# Patient Record
Sex: FEMALE | Race: WHITE | NOT HISPANIC OR LATINO | Employment: FULL TIME | URBAN - METROPOLITAN AREA
[De-identification: names, ages, dates, MRNs, and addresses within clinical notes are randomized per-mention and may not be internally consistent; named-entity substitution may affect disease eponyms.]

---

## 2023-01-24 ENCOUNTER — HOSPITAL ENCOUNTER (INPATIENT)
Facility: CLINIC | Age: 28
LOS: 1 days | Discharge: HOME | End: 2023-01-25
Attending: FAMILY MEDICINE | Admitting: OBSTETRICS & GYNECOLOGY
Payer: COMMERCIAL

## 2023-01-24 ENCOUNTER — TRANSFERRED RECORDS (OUTPATIENT)
Dept: HEALTH INFORMATION MANAGEMENT | Facility: CLINIC | Age: 28
End: 2023-01-24

## 2023-01-24 DIAGNOSIS — N99.820 POSTOPERATIVE VAGINAL BLEEDING FOLLOWING GENITOURINARY PROCEDURE: ICD-10-CM

## 2023-01-24 DIAGNOSIS — Z30.013 ENCOUNTER FOR INITIAL PRESCRIPTION OF INJECTABLE CONTRACEPTIVE: Primary | ICD-10-CM

## 2023-01-24 DIAGNOSIS — I95.81 HYPOTENSION AFTER PROCEDURE: ICD-10-CM

## 2023-01-24 LAB
ABO/RH(D): ABNORMAL
ALBUMIN SERPL-MCNC: 3.2 G/DL (ref 3.4–5)
ALBUMIN UR-MCNC: 10 MG/DL
ALP SERPL-CCNC: 64 U/L (ref 40–150)
ALT SERPL W P-5'-P-CCNC: 14 U/L (ref 0–50)
ANION GAP SERPL CALCULATED.3IONS-SCNC: 8 MMOL/L (ref 3–14)
ANTIBODY ID: NORMAL
ANTIBODY SCREEN: POSITIVE
APPEARANCE UR: CLEAR
APTT PPP: 23 SECONDS (ref 22–38)
AST SERPL W P-5'-P-CCNC: 10 U/L (ref 0–45)
B-HCG SERPL-ACNC: 104 IU/L (ref 0–5)
BASOPHILS # BLD AUTO: 0.1 10E3/UL (ref 0–0.2)
BASOPHILS NFR BLD AUTO: 1 %
BILIRUB SERPL-MCNC: 0.4 MG/DL (ref 0.2–1.3)
BILIRUB UR QL STRIP: NEGATIVE
BLD PROD TYP BPU: NORMAL
BLOOD COMPONENT TYPE: NORMAL
BUN SERPL-MCNC: 11 MG/DL (ref 7–30)
CA-I BLD-MCNC: 5 MG/DL (ref 4.4–5.2)
CALCIUM SERPL-MCNC: 8.3 MG/DL (ref 8.5–10.1)
CHLORIDE BLD-SCNC: 111 MMOL/L (ref 94–109)
CO2 SERPL-SCNC: 23 MMOL/L (ref 20–32)
CODING SYSTEM: NORMAL
COLOR UR AUTO: ABNORMAL
CPB POCT: NO
CREAT SERPL-MCNC: 0.89 MG/DL (ref 0.52–1.04)
EOSINOPHIL # BLD AUTO: 0.2 10E3/UL (ref 0–0.7)
EOSINOPHIL NFR BLD AUTO: 2 %
ERYTHROCYTE [DISTWIDTH] IN BLOOD BY AUTOMATED COUNT: 12 % (ref 10–15)
GFR SERPL CREATININE-BSD FRML MDRD: 90 ML/MIN/1.73M2
GLUCOSE BLD-MCNC: 185 MG/DL (ref 70–99)
GLUCOSE BLD-MCNC: 192 MG/DL (ref 70–99)
GLUCOSE UR STRIP-MCNC: NEGATIVE MG/DL
HCO3 BLDV-SCNC: 20 MMOL/L (ref 21–28)
HCT VFR BLD AUTO: 36.6 % (ref 35–47)
HCT VFR BLD CALC: 35 % (ref 35–47)
HGB BLD-MCNC: 11.9 G/DL (ref 11.7–15.7)
HGB BLD-MCNC: 12 G/DL (ref 11.7–15.7)
HGB UR QL STRIP: ABNORMAL
HOLD SPECIMEN: NORMAL
HYALINE CASTS: 4 /LPF
IMM GRANULOCYTES # BLD: 0.1 10E3/UL
IMM GRANULOCYTES NFR BLD: 1 %
INR BLD: 1.5 (ref 2–3)
INR PPP: 1.18 (ref 0.85–1.15)
ISSUE DATE AND TIME: NORMAL
KETONES UR STRIP-MCNC: NEGATIVE MG/DL
LACTATE SERPL-SCNC: 1.6 MMOL/L (ref 0.7–2)
LEUKOCYTE ESTERASE UR QL STRIP: NEGATIVE
LYMPHOCYTES # BLD AUTO: 6.4 10E3/UL (ref 0.8–5.3)
LYMPHOCYTES NFR BLD AUTO: 49 %
M AG RBC QL: NEGATIVE
MCH RBC QN AUTO: 31.1 PG (ref 26.5–33)
MCHC RBC AUTO-ENTMCNC: 32.8 G/DL (ref 31.5–36.5)
MCV RBC AUTO: 95 FL (ref 78–100)
MONOCYTES # BLD AUTO: 0.6 10E3/UL (ref 0–1.3)
MONOCYTES NFR BLD AUTO: 4 %
MUCOUS THREADS #/AREA URNS LPF: PRESENT /LPF
NEUTROPHILS # BLD AUTO: 5.6 10E3/UL (ref 1.6–8.3)
NEUTROPHILS NFR BLD AUTO: 43 %
NITRATE UR QL: NEGATIVE
NRBC # BLD AUTO: 0 10E3/UL
NRBC BLD AUTO-RTO: 0 /100
PCO2 BLDV: 41 MM HG (ref 40–50)
PH BLDV: 7.3 [PH] (ref 7.32–7.43)
PH UR STRIP: 6 [PH] (ref 5–7)
PLAT MORPH BLD: NORMAL
PLATELET # BLD AUTO: 258 10E3/UL (ref 150–450)
PO2 BLDV: 53 MM HG (ref 25–47)
POTASSIUM BLD-SCNC: 3.5 MMOL/L (ref 3.4–5.3)
POTASSIUM BLD-SCNC: 3.6 MMOL/L (ref 3.4–5.3)
PROT SERPL-MCNC: 5.7 G/DL (ref 6.8–8.8)
RBC # BLD AUTO: 3.86 10E6/UL (ref 3.8–5.2)
RBC MORPH BLD: NORMAL
RBC URINE: 26 /HPF
SAO2 % BLDV: 83 % (ref 94–100)
SODIUM BLD-SCNC: 140 MMOL/L (ref 133–144)
SODIUM SERPL-SCNC: 142 MMOL/L (ref 133–144)
SP GR UR STRIP: 1.02 (ref 1–1.03)
SPECIMEN EXPIRATION DATE: ABNORMAL
SPECIMEN EXPIRATION DATE: NORMAL
SPECIMEN EXPIRATION DATE: NORMAL
SQUAMOUS EPITHELIAL: 1 /HPF
UNIT ABO/RH: NORMAL
UNIT NUMBER: NORMAL
UNIT STATUS: NORMAL
UNIT TYPE ISBT: 9500
UROBILINOGEN UR STRIP-MCNC: NORMAL MG/DL
WBC # BLD AUTO: 12.8 10E3/UL (ref 4–11)
WBC URINE: 4 /HPF

## 2023-01-24 PROCEDURE — 250N000011 HC RX IP 250 OP 636

## 2023-01-24 PROCEDURE — 86905 BLOOD TYPING RBC ANTIGENS: CPT | Performed by: FAMILY MEDICINE

## 2023-01-24 PROCEDURE — 86901 BLOOD TYPING SEROLOGIC RH(D): CPT | Performed by: FAMILY MEDICINE

## 2023-01-24 PROCEDURE — 81001 URINALYSIS AUTO W/SCOPE: CPT

## 2023-01-24 PROCEDURE — 83605 ASSAY OF LACTIC ACID: CPT

## 2023-01-24 PROCEDURE — 99291 CRITICAL CARE FIRST HOUR: CPT | Mod: 25

## 2023-01-24 PROCEDURE — 84702 CHORIONIC GONADOTROPIN TEST: CPT | Performed by: FAMILY MEDICINE

## 2023-01-24 PROCEDURE — 86870 RBC ANTIBODY IDENTIFICATION: CPT | Performed by: FAMILY MEDICINE

## 2023-01-24 PROCEDURE — 86850 RBC ANTIBODY SCREEN: CPT | Performed by: FAMILY MEDICINE

## 2023-01-24 PROCEDURE — 99292 CRITICAL CARE ADDL 30 MIN: CPT | Mod: 25

## 2023-01-24 PROCEDURE — 36415 COLL VENOUS BLD VENIPUNCTURE: CPT | Performed by: FAMILY MEDICINE

## 2023-01-24 PROCEDURE — 85025 COMPLETE CBC W/AUTO DIFF WBC: CPT | Performed by: FAMILY MEDICINE

## 2023-01-24 PROCEDURE — 99291 CRITICAL CARE FIRST HOUR: CPT | Performed by: FAMILY MEDICINE

## 2023-01-24 PROCEDURE — 250N000011 HC RX IP 250 OP 636: Performed by: FAMILY MEDICINE

## 2023-01-24 PROCEDURE — 85730 THROMBOPLASTIN TIME PARTIAL: CPT | Performed by: FAMILY MEDICINE

## 2023-01-24 PROCEDURE — 85610 PROTHROMBIN TIME: CPT | Performed by: FAMILY MEDICINE

## 2023-01-24 PROCEDURE — 80053 COMPREHEN METABOLIC PANEL: CPT

## 2023-01-24 PROCEDURE — 36415 COLL VENOUS BLD VENIPUNCTURE: CPT

## 2023-01-24 PROCEDURE — 82947 ASSAY GLUCOSE BLOOD QUANT: CPT | Performed by: FAMILY MEDICINE

## 2023-01-24 PROCEDURE — 258N000003 HC RX IP 258 OP 636: Performed by: FAMILY MEDICINE

## 2023-01-24 PROCEDURE — 87040 BLOOD CULTURE FOR BACTERIA: CPT

## 2023-01-24 PROCEDURE — 120N000002 HC R&B MED SURG/OB UMMC

## 2023-01-24 PROCEDURE — 82947 ASSAY GLUCOSE BLOOD QUANT: CPT

## 2023-01-24 PROCEDURE — 85610 PROTHROMBIN TIME: CPT

## 2023-01-24 PROCEDURE — 99223 1ST HOSP IP/OBS HIGH 75: CPT | Mod: GC | Performed by: OBSTETRICS & GYNECOLOGY

## 2023-01-24 PROCEDURE — P9016 RBC LEUKOCYTES REDUCED: HCPCS

## 2023-01-24 RX ORDER — SODIUM CHLORIDE, SODIUM LACTATE, POTASSIUM CHLORIDE, CALCIUM CHLORIDE 600; 310; 30; 20 MG/100ML; MG/100ML; MG/100ML; MG/100ML
INJECTION, SOLUTION INTRAVENOUS CONTINUOUS
Status: DISCONTINUED | OUTPATIENT
Start: 2023-01-24 | End: 2023-01-24

## 2023-01-24 RX ORDER — CEFAZOLIN SODIUM 2 G/100ML
2 INJECTION, SOLUTION INTRAVENOUS EVERY 8 HOURS
Status: DISCONTINUED | OUTPATIENT
Start: 2023-01-24 | End: 2023-01-25

## 2023-01-24 RX ORDER — LIDOCAINE 40 MG/G
CREAM TOPICAL
Status: DISCONTINUED | OUTPATIENT
Start: 2023-01-24 | End: 2023-01-25 | Stop reason: HOSPADM

## 2023-01-24 RX ORDER — METHYLERGONOVINE MALEATE 0.2 MG/ML
200 INJECTION INTRAVENOUS ONCE
Status: COMPLETED | OUTPATIENT
Start: 2023-01-24 | End: 2023-01-24

## 2023-01-24 RX ORDER — PROCHLORPERAZINE MALEATE 5 MG
10 TABLET ORAL EVERY 6 HOURS PRN
Status: DISCONTINUED | OUTPATIENT
Start: 2023-01-24 | End: 2023-01-25 | Stop reason: HOSPADM

## 2023-01-24 RX ORDER — ONDANSETRON 2 MG/ML
4 INJECTION INTRAMUSCULAR; INTRAVENOUS EVERY 6 HOURS PRN
Status: DISCONTINUED | OUTPATIENT
Start: 2023-01-24 | End: 2023-01-25 | Stop reason: HOSPADM

## 2023-01-24 RX ORDER — HYDROXYZINE HYDROCHLORIDE 50 MG/1
50 TABLET, FILM COATED ORAL EVERY 6 HOURS PRN
Status: DISCONTINUED | OUTPATIENT
Start: 2023-01-24 | End: 2023-01-25 | Stop reason: HOSPADM

## 2023-01-24 RX ORDER — SODIUM CHLORIDE, SODIUM LACTATE, POTASSIUM CHLORIDE, CALCIUM CHLORIDE 600; 310; 30; 20 MG/100ML; MG/100ML; MG/100ML; MG/100ML
1000 INJECTION, SOLUTION INTRAVENOUS CONTINUOUS
Status: DISCONTINUED | OUTPATIENT
Start: 2023-01-24 | End: 2023-01-24

## 2023-01-24 RX ORDER — SODIUM CHLORIDE 9 MG/ML
INJECTION, SOLUTION INTRAVENOUS CONTINUOUS
Status: DISCONTINUED | OUTPATIENT
Start: 2023-01-24 | End: 2023-01-25

## 2023-01-24 RX ORDER — LANOLIN ALCOHOL/MO/W.PET/CERES
3 CREAM (GRAM) TOPICAL
Status: DISCONTINUED | OUTPATIENT
Start: 2023-01-24 | End: 2023-01-25 | Stop reason: HOSPADM

## 2023-01-24 RX ORDER — LEVONORGESTREL AND ETHINYL ESTRADIOL 0.15-0.03
1 KIT ORAL DAILY
Status: ON HOLD | COMMUNITY
End: 2023-01-25

## 2023-01-24 RX ORDER — SODIUM CHLORIDE, SODIUM LACTATE, POTASSIUM CHLORIDE, CALCIUM CHLORIDE 600; 310; 30; 20 MG/100ML; MG/100ML; MG/100ML; MG/100ML
1000 INJECTION, SOLUTION INTRAVENOUS CONTINUOUS
Status: DISCONTINUED | OUTPATIENT
Start: 2023-01-24 | End: 2023-01-25

## 2023-01-24 RX ORDER — PROCHLORPERAZINE 25 MG
25 SUPPOSITORY, RECTAL RECTAL EVERY 12 HOURS PRN
Status: DISCONTINUED | OUTPATIENT
Start: 2023-01-24 | End: 2023-01-25 | Stop reason: HOSPADM

## 2023-01-24 RX ORDER — ONDANSETRON 4 MG/1
4 TABLET, ORALLY DISINTEGRATING ORAL EVERY 6 HOURS PRN
Status: DISCONTINUED | OUTPATIENT
Start: 2023-01-24 | End: 2023-01-25 | Stop reason: HOSPADM

## 2023-01-24 RX ADMIN — CEFAZOLIN SODIUM 2 G: 2 INJECTION, SOLUTION INTRAVENOUS at 20:50

## 2023-01-24 RX ADMIN — SODIUM CHLORIDE, POTASSIUM CHLORIDE, SODIUM LACTATE AND CALCIUM CHLORIDE: 600; 310; 30; 20 INJECTION, SOLUTION INTRAVENOUS at 16:14

## 2023-01-24 RX ADMIN — METHYLERGONOVINE MALEATE 200 MCG: 0.2 INJECTION INTRAVENOUS at 16:18

## 2023-01-24 RX ADMIN — SODIUM CHLORIDE, POTASSIUM CHLORIDE, SODIUM LACTATE AND CALCIUM CHLORIDE 1000 ML: 600; 310; 30; 20 INJECTION, SOLUTION INTRAVENOUS at 16:59

## 2023-01-24 RX ADMIN — SODIUM CHLORIDE, POTASSIUM CHLORIDE, SODIUM LACTATE AND CALCIUM CHLORIDE 1000 ML: 600; 310; 30; 20 INJECTION, SOLUTION INTRAVENOUS at 17:57

## 2023-01-24 RX ADMIN — SODIUM CHLORIDE 1000 ML: 9 INJECTION, SOLUTION INTRAVENOUS at 16:13

## 2023-01-24 ASSESSMENT — ENCOUNTER SYMPTOMS
NAUSEA: 0
SHORTNESS OF BREATH: 0
TROUBLE SWALLOWING: 0
COUGH: 0
FATIGUE: 1
FEVER: 0
BACK PAIN: 0
ABDOMINAL PAIN: 0
WEAKNESS: 1
VOICE CHANGE: 0
ACTIVITY CHANGE: 1
DECREASED CONCENTRATION: 1
HEADACHES: 0
VOMITING: 0
CONFUSION: 0
AGITATION: 0
BRUISES/BLEEDS EASILY: 0
BLOOD IN STOOL: 0
LIGHT-HEADEDNESS: 1

## 2023-01-24 ASSESSMENT — ACTIVITIES OF DAILY LIVING (ADL)
ADLS_ACUITY_SCORE: 18
ADLS_ACUITY_SCORE: 18
DRESSING/BATHING_DIFFICULTY: NO
ADLS_ACUITY_SCORE: 18
CHANGE_IN_FUNCTIONAL_STATUS_SINCE_ONSET_OF_CURRENT_ILLNESS/INJURY: NO
WALKING_OR_CLIMBING_STAIRS_DIFFICULTY: NO
TOILETING_ISSUES: NO
CONCENTRATING,_REMEMBERING_OR_MAKING_DECISIONS_DIFFICULTY: NO
DOING_ERRANDS_INDEPENDENTLY_DIFFICULTY: NO
ADLS_ACUITY_SCORE: 35
WEAR_GLASSES_OR_BLIND: NO
FALL_HISTORY_WITHIN_LAST_SIX_MONTHS: NO
DIFFICULTY_EATING/SWALLOWING: NO

## 2023-01-24 NOTE — ED TRIAGE NOTES
[facility] terminated nonviable 7 w 2 d pregnancy       Triage Assessment     Row Name 01/24/23 1925       Triage Assessment (Adult)    Airway WDL WDL       Respiratory WDL    Respiratory WDL WDL       Skin Circulation/Temperature WDL    Skin Circulation/Temperature WDL WDL       Cardiac WDL    Cardiac WDL WDL       Peripheral/Neurovascular WDL    Peripheral Neurovascular WDL WDL       Cognitive/Neuro/Behavioral WDL    Cognitive/Neuro/Behavioral WDL WDL

## 2023-01-24 NOTE — ED PROVIDER NOTES
ED Provider Note  [facility] [place]      History     Chief Complaint   Patient presents with     Vaginal Bleeding     HPI  Mayda Ferro is a 27 year old female who who presents by ambulance from [facility] with excessive vaginal bleeding post elective termination of pregnancy approximately 7 weeks.  History somewhat limited because acuity of the case.  Patient apparently later history noted that she a few weeks ago had taken either oral medications and vaginal medications as abortifacient but with follow-up was not successful therefore operative procedure done a suction curettage with fentanyl 100 mics along with 2 mg of Versed to be given.  Patient had approxifive and cc bleeding from a vaginal catheter had 1 IV placed IV fluids given blood pressures were in the 70s heart rate around 70-60 patient cool clammy although still responsive without shortness of breath chest pain no abdominal pain now transferred to the ER for evaluation.  Patient complains of generalized weakness without true syncope denies chest pain no history of bleeding disorders otherwise or no history of any coagulopathy.  No fevers chills reported.  No abdominal pain otherwise or neurological changes noted.    Past Medical and Surgical History, Medications, Allergies, and Social History were reviewed in the electronic medical record. Review with the patient was attempted but limited due to altered mental status.      Review of Systems   Unable to perform ROS: Acuity of condition   Constitutional: Positive for activity change and fatigue. Negative for fever.   HENT: Negative for trouble swallowing and voice change.    Respiratory: Negative for cough and shortness of breath.    Cardiovascular: Negative for chest pain and leg swelling.   Gastrointestinal: Negative for abdominal pain, blood in stool, nausea and vomiting.   Genitourinary: Positive for vaginal bleeding. Negative for menstrual problem and vaginal pain.  "  Musculoskeletal: Negative for back pain.   Skin: Positive for pallor.   Allergic/Immunologic: Negative for immunocompromised state.   Neurological: Positive for weakness and light-headedness. Negative for syncope and headaches.   Hematological: Does not bruise/bleed easily.   Psychiatric/Behavioral: Positive for decreased concentration. Negative for agitation and confusion.   All other systems reviewed and are negative.      Physical Exam   BP: 92/80  Pulse: 63  Temp: 97.1  F (36.2  C)  Resp: 27  Height: 165 cm (5' 4.96\")  Weight: 85.7 kg (189 lb)  SpO2: 99 %  Physical Exam  Vitals and nursing note reviewed.   Constitutional:       General: She is in acute distress.      Appearance: She is well-developed. She is ill-appearing and diaphoretic.      Comments: Patient upon arrival by ambulance is pale cool clammy mildly hypotensive but not tachycardic still speaking full sentences ox saturation stable breathing without respiratory difficulty is not agitated or combative denies abdominal pain   HENT:      Head: Normocephalic and atraumatic.      Nose: Nose normal.      Mouth/Throat:      Mouth: Mucous membranes are moist.      Pharynx: Oropharynx is clear.   Eyes:      General: No scleral icterus.     Extraocular Movements: Extraocular movements intact.      Conjunctiva/sclera: Conjunctivae normal.      Pupils: Pupils are equal, round, and reactive to light.   Cardiovascular:      Rate and Rhythm: Normal rate and regular rhythm.   Pulmonary:      Effort: No respiratory distress.      Breath sounds: No stridor.   Abdominal:      General: There is no distension.      Palpations: Abdomen is soft. There is no mass.      Tenderness: There is no abdominal tenderness. There is no guarding.      Comments: Abdomen soft nonrigid.  There is no tenderness this upon examination.   Genitourinary:     Comments: Patient with uterine catheter in approximate 900 cc of blood noted.  Apparently 500 cc [facility].  Musculoskeletal:      Cervical back: Normal range of motion and neck supple. No rigidity or tenderness.   Skin:     General: Skin is warm.      Capillary Refill: Capillary refill takes 2 to 3 seconds.      Coloration: Skin is pale. Skin is not jaundiced.      Findings: No erythema or rash.   Neurological:      General: No focal deficit present.      Mental Status: She is alert and oriented to person, place, and time. Mental status is at baseline.   Psychiatric:      Comments: Flattened affect here in the ER otherwise appropriate.           ED Course, Procedures, & Data         Patient valuate upon arrival ambulance here in the ER.  We have paged OB but then did talk to the did come down see the patient prior to this we establish 2 large-bore IVs IV fluid resuscitation with normal saline followed by lactated Ringer's we ordered a type and cross for 2 units and also ordered O- 2 units also that would be brought down here in the ER to have if needed.  Patient had initially i-STAT hemoglobin noted to be 11.9 prior to procedure was reported to be 14.    Patient seen by OB/GYN staff down here in the ER.  They did a bedside ultrasound at this point felt there was not a large amount of blood still of the uterus by ultrasound and there was no free fluid otherwise noted.  This point patient will be continually monitored following serial hemoglobins will wait to see what laboratory hemoglobin was.  Blood pressures have been ranging upper 80s to 90s heart rate approximately 60 patient clinically appears better here in the ER.  Upon admit to OB/GYN for ongoing monitoring of postprocedural bleeding.    Patient's boyfriend also present in the ER.    Patient initial bedside evaluation done by OB here in the ER noted above.    Patient with IV fluid resuscitation received total of 3 L initially saline and lactated Ringer's and continuous infusion after that.  Patient continually monitored blood pressures did improve from  80s systolic now 200 heart rate is maintained patient appears much improved as far as color etc. does not appear to be pallorous anymore is neurologically intact functioning interacting with family also was present here in the ER.    Discussed with OB updated them they did reevaluate the patient also prior to patient being admitted their unit.  Discussed with OB also they were to order some IV antibiotics to make sure no infection and they will do so regarding this also.    Patient did have 2 units of red cells typed and crossed we were able to take the O- back to the blood bank as did not require needing this.  Patient continued vaginal output seem to have diminished and is maintained approximately less than 1000 cc total.    Patient's white count 12.8 hemoglobin is 12 INR 1.5.  Bedside but lab 1.18.  hCG was 104.  Sodium 142 potassium 3.6 creatinine 0.89.  Calcium 8.3 glucose 185.  Liver function test normal limits.    As noted patient is otherwise markedly improving with fluid resuscitation also given Methergine 0.2 mg IM to control bleeding which is certainly helped.  At this point no sign of any ongoing hemorrhage or decompensation with vital signs patient's hypotension is markedly improved becoming more normotensive after fluid resuscitation is noted.  Patient to be admitted to OB service for ongoing management of postprocedural hemorrhage now improving.          Records reviewed from [facility] that were sent here also.  With patient's past history etc.    Procedures       ED Course Selections: Critical Care Addendum    My initial assessment, based on my review of prehospital provider report, review of nursing observations, review of vital signs, focused history, physical exam, review of cardiac rhythm monitor, discussion with OB and interpretation of labs and patient response to treatment , established that Mayda Ferro has severe hemorrhage, which requires immediate intervention, and therefore  she is critically ill.     After the initial assessment, the care team initiated multiple lab tests, initiated IV fluid administration, initiated medication therapy with IV fluids and methergine 0.2mg IM and consulted with OB to provide stabilization care. Due to the critical nature of this patient, I reassessed nursing observations, vital signs, physical exam, review of cardiac rhythm monitor, 12 lead ECG analysis, interpretation of labs, mental status, neurologic status and respiratory status multiple times prior to her disposition.     Time also spent performing documentation, reviewing test results, discussion with consultants and coordination of care.     Critical care time (excluding teaching time and procedures): 60 minutes.                Results for orders placed or performed during the hospital encounter of 01/24/23   INR     Status: Abnormal   Result Value Ref Range    INR 1.18 (H) 0.85 - 1.15   Partial thromboplastin time     Status: Normal   Result Value Ref Range    aPTT 23 22 - 38 Seconds   Comprehensive metabolic panel     Status: Abnormal   Result Value Ref Range    Sodium 142 133 - 144 mmol/L    Potassium 3.6 3.4 - 5.3 mmol/L    Chloride 111 (H) 94 - 109 mmol/L    Carbon Dioxide (CO2) 23 20 - 32 mmol/L    Anion Gap 8 3 - 14 mmol/L    Urea Nitrogen 11 7 - 30 mg/dL    Creatinine 0.89 0.52 - 1.04 mg/dL    Calcium 8.3 (L) 8.5 - 10.1 mg/dL    Glucose 185 (H) 70 - 99 mg/dL    Alkaline Phosphatase 64 40 - 150 U/L    AST 10 0 - 45 U/L    ALT 14 0 - 50 U/L    Protein Total 5.7 (L) 6.8 - 8.8 g/dL    Albumin 3.2 (L) 3.4 - 5.0 g/dL    Bilirubin Total 0.4 0.2 - 1.3 mg/dL    GFR Estimate 90 >60 mL/min/1.73m2   HCG quantitative pregnancy     Status: Abnormal   Result Value Ref Range    hCG Quantitative 104 (H) 0 - 5 IU/L   UA with Microscopic reflex to Culture     Status: Abnormal    Specimen: Urine, Midstream   Result Value Ref Range    Color Urine Light Yellow Colorless, Straw, Light Yellow, Yellow     Appearance Urine Clear Clear    Glucose Urine Negative Negative mg/dL    Bilirubin Urine Negative Negative    Ketones Urine Negative Negative mg/dL    Specific Gravity Urine 1.019 1.003 - 1.035    Blood Urine Moderate (A) Negative    pH Urine 6.0 5.0 - 7.0    Protein Albumin Urine 10 (A) Negative mg/dL    Urobilinogen Urine Normal Normal, 2.0 mg/dL    Nitrite Urine Negative Negative    Leukocyte Esterase Urine Negative Negative    Mucus Urine Present (A) None Seen /LPF    RBC Urine 26 (H) <=2 /HPF    WBC Urine 4 <=5 /HPF    Squamous Epithelials Urine 1 <=1 /HPF    Hyaline Casts Urine 4 (H) <=2 /LPF    Narrative    Urine Culture not indicated   [place] Draw     Status: None (In process)    Narrative    The following orders were created for panel order [place] Draw.  Procedure                               Abnormality         Status                     ---------                               -----------         ------                     Extra Blue Top Tube[[id number]]                              Final result               Extra Red Top Tube[[id number]]                               Final result               Extra Green Top (Lithium...[[id number]]                                                 Extra Purple Top Tube[[id number]]                                                       Extra Purple Top Tube[[id number]]                            Final result                 Please view results for these tests on the individual orders.   CBC with platelets and differential     Status: Abnormal   Result Value Ref Range    WBC Count 12.8 (H) 4.0 - 11.0 10e3/uL    RBC Count 3.86 3.80 - 5.20 10e6/uL    Hemoglobin 12.0 11.7 - 15.7 g/dL    Hematocrit 36.6 35.0 - 47.0 %    MCV 95 78 - 100 fL    MCH 31.1 26.5 - 33.0 pg    MCHC 32.8 31.5 - 36.5 g/dL    RDW 12.0 10.0 - 15.0 %    Platelet Count 258 150 - 450 10e3/uL    % Neutrophils 43 %    % Lymphocytes 49 %    % Monocytes 4 %    % Eosinophils 2 %    % Basophils 1 %    % Immature  Granulocytes 1 %    NRBCs per 100 WBC 0 <1 /100    Absolute Neutrophils 5.6 1.6 - 8.3 10e3/uL    Absolute Lymphocytes 6.4 (H) 0.8 - 5.3 10e3/uL    Absolute Monocytes 0.6 0.0 - 1.3 10e3/uL    Absolute Eosinophils 0.2 0.0 - 0.7 10e3/uL    Absolute Basophils 0.1 0.0 - 0.2 10e3/uL    Absolute Immature Granulocytes 0.1 <=0.4 10e3/uL    Absolute NRBCs 0.0 10e3/uL   Extra Blue Top Tube     Status: None   Result Value Ref Range    Hold Specimen JIC    Extra Red Top Tube     Status: None   Result Value Ref Range    Hold Specimen JIC    Extra Purple Top Tube     Status: None   Result Value Ref Range    Hold Specimen JIC    iStat INR, POCT     Status: Abnormal   Result Value Ref Range    INR POCT 1.5 (L) 2.0 - 3.0   iStat Gases Electrolytes ICA Glucose Venous, POCT     Status: Abnormal   Result Value Ref Range    CPB Applied No     Hematocrit POCT 35 35 - 47 %    Calcium, Ionized Whole Blood POCT 5.0 4.4 - 5.2 mg/dL    Glucose Whole Blood POCT 192 (H) 70 - 99 mg/dL    Bicarbonate Venous POCT 20 (L) 21 - 28 mmol/L    Hemoglobin POCT 11.9 11.7 - 15.7 g/dL    Potassium POCT 3.5 3.4 - 5.3 mmol/L    Sodium POCT 140 133 - 144 mmol/L    pCO2 Venous POCT 41 40 - 50 mm Hg    pO2 Venous POCT 53 (H) 25 - 47 mm Hg    pH Venous POCT 7.30 (L) 7.32 - 7.43    O2 Sat, Venous POCT 83 (L) 94 - 100 %   RBC and Platelet Morphology     Status: None   Result Value Ref Range    Platelet Assessment  Automated Count Confirmed. Platelet morphology is normal.     Automated Count Confirmed. Platelet morphology is normal.    RBC Morphology Confirmed RBC Indices    Lactic acid whole blood     Status: Normal   Result Value Ref Range    Lactic Acid 1.6 0.7 - 2.0 mmol/L   Extra Tube     Status: None    Narrative    The following orders were created for panel order Extra Tube.  Procedure                               Abnormality         Status                     ---------                               -----------         ------                     Extra Purple  Top Tube[[id number]]                            Final result                 Please view results for these tests on the individual orders.   Extra Purple Top Tube     Status: None   Result Value Ref Range    Hold Specimen [facility]    Basic metabolic panel     Status: Abnormal   Result Value Ref Range    Sodium 143 133 - 144 mmol/L    Potassium 4.0 3.4 - 5.3 mmol/L    Chloride 113 (H) 94 - 109 mmol/L    Carbon Dioxide (CO2) 24 20 - 32 mmol/L    Anion Gap 6 3 - 14 mmol/L    Urea Nitrogen 7 7 - 30 mg/dL    Creatinine 0.68 0.52 - 1.04 mg/dL    Calcium 8.0 (L) 8.5 - 10.1 mg/dL    Glucose 103 (H) 70 - 99 mg/dL    GFR Estimate >90 >60 mL/min/1.73m2   CBC with platelets     Status: Abnormal   Result Value Ref Range    WBC Count 7.7 4.0 - 11.0 10e3/uL    RBC Count 2.53 (L) 3.80 - 5.20 10e6/uL    Hemoglobin 7.9 (L) 11.7 - 15.7 g/dL    Hematocrit 23.9 (L) 35.0 - 47.0 %    MCV 95 78 - 100 fL    MCH 31.2 26.5 - 33.0 pg    MCHC 33.1 31.5 - 36.5 g/dL    RDW 12.3 10.0 - 15.0 %    Platelet Count 129 (L) 150 - 450 10e3/uL   Adult Type and Screen     Status: Abnormal   Result Value Ref Range    ABO/RH(D) A POS     Antibody Screen Positive (A) Negative    SPECIMEN EXPIRATION DATE 20230127235900    Prepare red blood cells (unit)     Status: None (Preliminary result)   Result Value Ref Range    ISSUE DATE AND TIME 20230124160300     Blood Component Type Red Blood Cells     Product Code P8603C44     Unit Status Issued     Unit Number [id number]     UNIT ABO/RH O-     CODING SYSTEM [id number]     UNIT TYPE ISBT 9500    Prepare red blood cells (unit)     Status: None (Preliminary result)   Result Value Ref Range    ISSUE DATE AND TIME 20230124160300     Blood Component Type Red Blood Cells     Product Code C8228B38     Unit Status Issued     Unit Number [id number]     UNIT ABO/RH O-     CODING SYSTEM [id number]     UNIT TYPE ISBT 9500    Prepare red blood cells (unit)     Status: None (Preliminary result)   Result Value Ref Range    ISSUE DATE  AND TIME 20230124160300     Blood Component Type Red Blood Cells     Product Code M3893M23     Unit Status Returned     Unit Number [id number]     UNIT ABO/RH O-     CODING SYSTEM [id number]     UNIT TYPE ISBT 9500    Prepare red blood cells (unit)     Status: None (Preliminary result)   Result Value Ref Range    ISSUE DATE AND TIME 20230124160300     Blood Component Type Red Blood Cells     Product Code A2148Q20     Unit Status Returned     Unit Number [id number]     UNIT ABO/RH O-     CODING SYSTEM [id number]     UNIT TYPE ISBT 9500    Red Cell Antigen Typing Non ABO:     Status: None   Result Value Ref Range    M Antigen Type Negative     SPECIMEN EXPIRATION DATE 20230127235900    Antibody identification     Status: None   Result Value Ref Range    Antibody Identification Anti-M     SPECIMEN EXPIRATION DATE 20230127235900    CBC with platelets differential     Status: Abnormal    Narrative    The following orders were created for panel order CBC with platelets differential.  Procedure                               Abnormality         Status                     ---------                               -----------         ------                     CBC with platelets and d...[[id number]]  Abnormal            Final result               RBC and Platelet Morphology[[id number]]                      Final result                 Please view results for these tests on the individual orders.   ABO/Rh type and screen     Status: Abnormal    Narrative    The following orders were created for panel order ABO/Rh type and screen.  Procedure                               Abnormality         Status                     ---------                               -----------         ------                     Adult Type and Screen[[id number]]        Abnormal            Final result                 Please view results for these tests on the individual orders.   ABO/Rh type and screen *Canceled*     Status: None ()    Narrative    The  following orders were created for panel order ABO/Rh type and screen.  Procedure                               Abnormality         Status                     ---------                               -----------         ------                       Please view results for these tests on the individual orders.   ABO/Rh type and screen *Canceled*     Status: None ()    Narrative    The following orders were created for panel order ABO/Rh type and screen.  Procedure                               Abnormality         Status                     ---------                               -----------         ------                     Adult Type and Screen[[id number]]                                                         Please view results for these tests on the individual orders.     Medications   lidocaine 1 % 0.1-1 mL (has no administration in time range)   lidocaine (LMX4) cream (has no administration in time range)   sodium chloride (PF) 0.9% PF flush 3 mL (3 mLs Intracatheter Given 1/25/23 0821)   sodium chloride (PF) 0.9% PF flush 3 mL (has no administration in time range)   lidocaine 1 % 0.1-1 mL (has no administration in time range)   lidocaine (LMX4) cream (has no administration in time range)   sodium chloride (PF) 0.9% PF flush 3 mL (0 mLs Intracatheter Hold 1/25/23 0700)   sodium chloride (PF) 0.9% PF flush 3 mL (has no administration in time range)   melatonin tablet 1 mg (has no administration in time range)   ondansetron (ZOFRAN ODT) ODT tab 4 mg (has no administration in time range)     Or   ondansetron (ZOFRAN) injection 4 mg (has no administration in time range)   prochlorperazine (COMPAZINE) injection 10 mg (has no administration in time range)     Or   prochlorperazine (COMPAZINE) tablet 10 mg (has no administration in time range)     Or   prochlorperazine (COMPAZINE) suppository 25 mg (has no administration in time range)   melatonin tablet 3 mg (has no administration in time range)   hydrOXYzine (ATARAX)  tablet 50 mg (has no administration in time range)   ibuprofen (ADVIL/MOTRIN) tablet 600 mg (has no administration in time range)   acetaminophen (TYLENOL) tablet 650 mg (has no administration in time range)   0.9% sodium chloride BOLUS (0 mLs Intravenous Stopped 1/24/23 1757)   methylergonovine (METHERGINE) injection 200 mcg (200 mcg Intramuscular Given 1/24/23 1618)   lactated ringers BOLUS 1,000 mL (0 mLs Intravenous Stopped 1/24/23 1700)     Labs Ordered and Resulted from Time of ED Arrival to Time of ED Departure   INR - Abnormal       Result Value    INR 1.18 (*)    COMPREHENSIVE METABOLIC PANEL - Abnormal    Sodium 142      Potassium 3.6      Chloride 111 (*)     Carbon Dioxide (CO2) 23      Anion Gap 8      Urea Nitrogen 11      Creatinine 0.89      Calcium 8.3 (*)     Glucose 185 (*)     Alkaline Phosphatase 64      AST 10      ALT 14      Protein Total 5.7 (*)     Albumin 3.2 (*)     Bilirubin Total 0.4      GFR Estimate 90     HCG QUANTITATIVE PREGNANCY - Abnormal    hCG Quantitative 104 (*)    CBC WITH PLATELETS AND DIFFERENTIAL - Abnormal    WBC Count 12.8 (*)     RBC Count 3.86      Hemoglobin 12.0      Hematocrit 36.6      MCV 95      MCH 31.1      MCHC 32.8      RDW 12.0      Platelet Count 258      % Neutrophils 43      % Lymphocytes 49      % Monocytes 4      % Eosinophils 2      % Basophils 1      % Immature Granulocytes 1      NRBCs per 100 WBC 0      Absolute Neutrophils 5.6      Absolute Lymphocytes 6.4 (*)     Absolute Monocytes 0.6      Absolute Eosinophils 0.2      Absolute Basophils 0.1      Absolute Immature Granulocytes 0.1      Absolute NRBCs 0.0     ISTAT INR POCT - Abnormal    INR POCT 1.5 (*)    ISTAT GASES ELECTROLYTES ICA GLUCOSE VENOUS POCT - Abnormal    CPB Applied No      Hematocrit POCT 35      Calcium, Ionized Whole Blood POCT 5.0      Glucose Whole Blood POCT 192 (*)     Bicarbonate Venous POCT 20 (*)     Hemoglobin POCT 11.9      Potassium POCT 3.5      Sodium POCT 140       pCO2 Venous POCT 41      pO2 Venous POCT 53 (*)     pH Venous POCT 7.30 (*)     O2 Sat, Venous POCT 83 (*)    TYPE AND SCREEN, ADULT - Abnormal    ABO/RH(D) A POS      Antibody Screen Positive (*)     SPECIMEN EXPIRATION DATE 20230127235900     PARTIAL THROMBOPLASTIN TIME - Normal    aPTT 23     RBC AND PLATELET MORPHOLOGY    Platelet Assessment        Value: Automated Count Confirmed. Platelet morphology is normal.    RBC Morphology Confirmed RBC Indices     PREPARE RED BLOOD CELLS (UNIT)    ISSUE DATE AND TIME 20230124160300      Blood Component Type Red Blood Cells      Product Code X6632F08      Unit Status Issued      Unit Number [id number]      UNIT ABO/RH O-      CODING SYSTEM [id number]      UNIT TYPE ISBT 9500     PREPARE RED BLOOD CELLS (UNIT)    ISSUE DATE AND TIME 20230124160300      Blood Component Type Red Blood Cells      Product Code C5764T39      Unit Status Issued      Unit Number [id number]      UNIT ABO/RH O-      CODING SYSTEM [id number]      UNIT TYPE ISBT 9500     PREPARE RED BLOOD CELLS (UNIT)    ISSUE DATE AND TIME 20230124160300      Blood Component Type Red Blood Cells      Product Code N3460L63      Unit Status Returned      Unit Number [id number]      UNIT ABO/RH O-      CODING SYSTEM [id number]      UNIT TYPE ISBT 9500     PREPARE RED BLOOD CELLS (UNIT)    ISSUE DATE AND TIME 20230124160300      Blood Component Type Red Blood Cells      Product Code A1438K73      Unit Status Returned      Unit Number [id number]      UNIT ABO/RH O-      CODING SYSTEM YTXA101      UNIT TYPE ISBT 9500     PREPARE RED BLOOD CELLS (UNIT)     No orders to display          Medical Decision Making  The patient's presentation is strongly suggestive of an acute illness with systemic symptoms and an acute health issue posing potential threat to life or bodily function.    The patient's evaluation involved:  review of external note(s) from 2 sources (see separate area of note for details)  ordering and/or review  of 3+ test(s) in this encounter (see separate area of note for details)  review of 2 test result(s) ordered prior to this encounter (see separate area of note for details)  discussion of management or test interpretation with another health professional (see separate area of note for details)    The patient's management involved drug therapy requiring intensive monitoring (see separate area of note for details), a decision regarding emergency major surgery and a decision regarding hospitalization.      Assessment & Plan   27-year-old female from [facility] after having marked amount of vaginal bleeding post termination of pregnancy procedure.  Patient presented to the ER hypotensive pale what appeared to be hemorrhagic shock and had approximate total of 900 cc of bloody output from uterine catheter.  Patient had 2 large-bore IVs established IV fluid resuscitation seen by OB also quick ultrasound showed no free fluid etc.  Patient's hemoglobin was stable at 12 bleeding was controlled with IM Methergine also and IV fluids.  Patient improved markedly with resuscitative efforts in the ER and is mild normotensive appropriate stable.  Patient to be admitted to OB unit for ongoing monitoring serial hemoglobins and evaluation of postprocedural hemorrhage.  Patient did not require blood products but we had O- ready initially aware to return this to the blood bank also.  Patient has blood products available if needed.  Further management by OB.       I have reviewed the nursing notes. I have reviewed the findings, diagnosis, plan and need for follow up with the patient.    Current Discharge Medication List          Final diagnoses:   Postoperative vaginal bleeding following genitourinary procedure   Hypotension after procedure       Darrel Jackson  [facility] EMERGENCY DEPARTMENT  1/24/2023    This note was created at least in part by the use of dragon voice dictation system. Inadvertent typographical  errors may still exist.  Darrel Jackson MD.    Patient evaluated in the emergency department during the COVID-19 pandemic period. Careful attention to patients safety was addressed throughout the evaluation. Evaluation and treatment management was initiated with disposition made efficiently and appropriate as possible to minimize any risk of potential exposure to patient during this evaluation.       Darrel Jackson MD  01/25/23 0922

## 2023-01-24 NOTE — H&P
[facility] History and Physical    Mayda Ferro MRN# [id number]   Age: 27 year old YOB: 1995     Date of Admission:  2023    Primary care provider: No primary care provider on file.    Patient Summary:  Mayda Ferro is a 27 year old woman who presents for vaginal bleeding [facility]. During the procedure there was concern for a false passage.  The procedure was able to be completed.  After the procedure it was noted larger amounts of bleeding than normal and a 35 ml gonzalez ballon was placed in the uterus. EMS was called and the patient was transported to [facility] ED. She received 100 units of fentanyl and 2g of versed prior to the procedure. POC confirmed after procedure.         HPI: In trauma bay, laying in bed. She states is not in pain but is very lightheaded and tired. She is also very anxious. Unable to get thorough history and physical due to ED staff needed to access patient.  Gonzalez bag to intrauterine gonzalez with 900cc of blood.    ROS: otherwise negative, see HPI    PMH: none  PSHx: three prior  sections    Medications:   Current Facility-Administered Medications   Medication    0.9% sodium chloride BOLUS    lactated ringers BOLUS 1,000 mL    lactated ringers infusion    lactated ringers infusion    lidocaine (LMX4) cream    lidocaine 1 % 0.1-1 mL    sodium chloride (PF) 0.9% PF flush 3 mL    sodium chloride (PF) 0.9% PF flush 3 mL     Current Outpatient Medications   Medication    levonorgestrel-ethinyl estradiol (CHATEAL) 0.15-30 MG-MCG tablet     No current facility-administered medications on file prior to encounter.  levonorgestrel-ethinyl estradiol (CHATEAL) 0.15-30 MG-MCG tablet, Take 1 tablet by mouth daily        Allergies:   No Known Allergies    Social History: no reported history of smoking or recreational drug use    Physical Exam:   Vitals:    23 1555 23 1611   BP: 92/80    Pulse: 63    SpO2: 99%    Weight:   "85.7 kg (189 lb)   Height:  1.65 m (5' 4.96\")      Gen: pale and diaphoretic  HEENT: Neck supple, no cervical lymphadenopathy; oral MMM  CV: appears pale, peripheral circulation adequate, hypotensive and normal HR  Pulm: equal chest rise and non-labored breathing  Abd: non-tender, non-distended, no peritoneal signs, no guarding   Pelvis: external exam negative for active bleeding, gonzalez coming out of vagina  Extremities: non-tender, no erythema;     BSUS: no free fluid in posterior cul-de-sac, gonzalez appropriately in endometrial cavity. No blood pooling in uterus or other pelvic areas.     Labs: CBC, BMP    A&P: Mayda Ferro is a 26 yo presenting via EMS to the ED for post operative complications and bleeding [facility].     #Postop Hemorrhage--s/p suction D&C 7wga (completed)  At this time the patient appears to be relatively stable. She is responsive to fluid resuscitation. There is no further bleeding noted on exam. We will admit and observe closely.   - total EBL is 1300. 900cc in the gonzalez bag currently, 400 immediately after the procedure.   - Hgb stable 11.8 down from 14, will continue to trend  - Leave gonzalez balloon in 24 hours  - Ancef 2g Q8h while balloon in  - consider IR for continued bleeding, patient aware and counseled on further steps needed if bleeding were to continue with surgical intervention and possible hysterectomy as last case scenario     #Hypotension  -patient is fluid responsive, likely hypovolemic hypotension, continue fluids      Romain Christiansen DO, MA   OBGYN-PGY1    Appreciate Dr. Christiansen's note above, patient also seen and examined by me along with the resident. I agree with the note above.   Nury Fletcher MD    "

## 2023-01-24 NOTE — ED NOTES
Bed: ED01  Expected date:   Expected time:   Means of arrival:   Comments:  Hold for [facility] [birth year][facility] for [facility]  post procedure hemmoraging

## 2023-01-25 LAB
ANION GAP SERPL CALCULATED.3IONS-SCNC: 6 MMOL/L (ref 3–14)
BUN SERPL-MCNC: 7 MG/DL (ref 7–30)
CALCIUM SERPL-MCNC: 8 MG/DL (ref 8.5–10.1)
CHLORIDE BLD-SCNC: 113 MMOL/L (ref 94–109)
CO2 SERPL-SCNC: 24 MMOL/L (ref 20–32)
CREAT SERPL-MCNC: 0.68 MG/DL (ref 0.52–1.04)
ERYTHROCYTE [DISTWIDTH] IN BLOOD BY AUTOMATED COUNT: 12.3 % (ref 10–15)
ERYTHROCYTE [DISTWIDTH] IN BLOOD BY AUTOMATED COUNT: 12.3 % (ref 10–15)
GFR SERPL CREATININE-BSD FRML MDRD: >90 ML/MIN/1.73M2
GLUCOSE BLD-MCNC: 103 MG/DL (ref 70–99)
HCT VFR BLD AUTO: 23.9 % (ref 35–47)
HCT VFR BLD AUTO: 24.8 % (ref 35–47)
HGB BLD-MCNC: 7.9 G/DL (ref 11.7–15.7)
HGB BLD-MCNC: 8 G/DL (ref 11.7–15.7)
MCH RBC QN AUTO: 31.2 PG (ref 26.5–33)
MCH RBC QN AUTO: 31.4 PG (ref 26.5–33)
MCHC RBC AUTO-ENTMCNC: 32.3 G/DL (ref 31.5–36.5)
MCHC RBC AUTO-ENTMCNC: 33.1 G/DL (ref 31.5–36.5)
MCV RBC AUTO: 95 FL (ref 78–100)
MCV RBC AUTO: 97 FL (ref 78–100)
PLATELET # BLD AUTO: 129 10E3/UL (ref 150–450)
PLATELET # BLD AUTO: 151 10E3/UL (ref 150–450)
POTASSIUM BLD-SCNC: 4 MMOL/L (ref 3.4–5.3)
RBC # BLD AUTO: 2.53 10E6/UL (ref 3.8–5.2)
RBC # BLD AUTO: 2.55 10E6/UL (ref 3.8–5.2)
SODIUM SERPL-SCNC: 143 MMOL/L (ref 133–144)
WBC # BLD AUTO: 6.9 10E3/UL (ref 4–11)
WBC # BLD AUTO: 7.7 10E3/UL (ref 4–11)

## 2023-01-25 PROCEDURE — 250N000011 HC RX IP 250 OP 636

## 2023-01-25 PROCEDURE — 85014 HEMATOCRIT: CPT | Performed by: STUDENT IN AN ORGANIZED HEALTH CARE EDUCATION/TRAINING PROGRAM

## 2023-01-25 PROCEDURE — 82310 ASSAY OF CALCIUM: CPT

## 2023-01-25 PROCEDURE — 36415 COLL VENOUS BLD VENIPUNCTURE: CPT | Performed by: STUDENT IN AN ORGANIZED HEALTH CARE EDUCATION/TRAINING PROGRAM

## 2023-01-25 PROCEDURE — 85027 COMPLETE CBC AUTOMATED: CPT

## 2023-01-25 PROCEDURE — 258N000003 HC RX IP 258 OP 636

## 2023-01-25 PROCEDURE — 36415 COLL VENOUS BLD VENIPUNCTURE: CPT

## 2023-01-25 RX ORDER — MEDROXYPROGESTERONE ACETATE 150 MG/ML
150 INJECTION, SUSPENSION INTRAMUSCULAR ONCE
Qty: 1 ML | Refills: 0 | OUTPATIENT
Start: 2023-01-25 | End: 2023-01-25

## 2023-01-25 RX ORDER — MEDROXYPROGESTERONE ACETATE 150 MG/ML
150 INJECTION, SUSPENSION INTRAMUSCULAR ONCE
Status: COMPLETED | OUTPATIENT
Start: 2023-01-25 | End: 2023-01-25

## 2023-01-25 RX ORDER — FERROUS SULFATE 325(65) MG
325 TABLET ORAL
Qty: 30 TABLET | Refills: 3 | Status: SHIPPED | OUTPATIENT
Start: 2023-01-25

## 2023-01-25 RX ORDER — IBUPROFEN 600 MG/1
600 TABLET, FILM COATED ORAL EVERY 6 HOURS PRN
Status: DISCONTINUED | OUTPATIENT
Start: 2023-01-25 | End: 2023-01-25 | Stop reason: HOSPADM

## 2023-01-25 RX ORDER — ACETAMINOPHEN 325 MG/1
650 TABLET ORAL EVERY 4 HOURS PRN
Status: DISCONTINUED | OUTPATIENT
Start: 2023-01-25 | End: 2023-01-25 | Stop reason: HOSPADM

## 2023-01-25 RX ADMIN — SODIUM CHLORIDE, POTASSIUM CHLORIDE, SODIUM LACTATE AND CALCIUM CHLORIDE 1000 ML: 600; 310; 30; 20 INJECTION, SOLUTION INTRAVENOUS at 04:48

## 2023-01-25 RX ADMIN — MEDROXYPROGESTERONE ACETATE 150 MG: 150 INJECTION, SUSPENSION INTRAMUSCULAR at 14:55

## 2023-01-25 RX ADMIN — CEFAZOLIN SODIUM 2 G: 2 INJECTION, SOLUTION INTRAVENOUS at 04:48

## 2023-01-25 ASSESSMENT — ACTIVITIES OF DAILY LIVING (ADL)
ADLS_ACUITY_SCORE: 18

## 2023-01-25 NOTE — DISCHARGE SUMMARY
[facility] Gynecology Discharge Summary    Patient: Mayda Ferro  MRN: [id number]  YOB: 1995    Date of admission: 2023  Date of discharge: 23  Admitting physician: Nury Fletcher MD  Discharging physician: Leora Neely MD  Discharging service: Gynecology     Admission diagnoses:  -  s/p suction D&cC for missed    - Post D&C hemorrhage  - Acute blood loss anemia secondary to hemorrhage related to procedural blood loss    Discharge diagnoses:  - Same as above   - Thrombocytopenia secondary to acute blood loss    Procedures: Intrauterine gonzalez balloon removal     Consults: None    Brief HPI:  Mayda Ferro is a 27 year old woman who presents for vaginal bleeding [facility]. During the procedure there was concern for a false passage.  The procedure was able to be completed.  After the procedure it was noted larger amounts of bleeding than normal and a 35 ml gonzalez ballon was placed in the uterus. EMS was called and the patient was transported to [facility] ED. She received 100 units of fentanyl and 2g of versed prior to the procedure. POC confirmed after procedure.      In trauma bay, laying in bed. She states is not in pain but is very lightheaded and tired. She is also very anxious. Unable to get thorough history and physical due to ED staff needed to access patient.  Gonzalez bag to intrauterine gonzalez with 900cc of blood.      Hospital course:  . Recovered as anticipated and experienced no post-operative complications. Prior to discharge the patient was voiding spontaneously, tolerating a regular diet, passing flatus, and had adequate pain control on an oral regimen. The patient was discharged on POD#2.       Discharge medications:     Review of your medicines      UNREVIEWED medicines. Ask your doctor about these medicines      Dose / Directions   Chateal 0.15-30 MG-MCG tablet  Generic drug: levonorgestrel-ethinyl estradiol      Dose: 1  tablet  Take 1 tablet by mouth daily  Refills: 0            Discharge disposition: Discharge to home     Discharge instructions and follow up:  Discharge diet: Regular   Discharge activity: Activity as tolerated   Discharge follow-up: Follow up with primary care provide AS NEEDED   Other instructions: None      Romain KRISHNAMURTHY PGY1    Staff MD Note    I evaluated the patient on the day of discharge.  We reviewed discharge instructions.  Patient stable for discharge.    Leora Neely MD

## 2023-01-25 NOTE — PLAN OF CARE
Discharged instruction given, all questions answered. Pt left the unit at 1430 via wheelchair and accompanied by her SO. She was stable.   Meseret Santo RN on 1/25/2023 at 3:58 PM

## 2023-01-25 NOTE — PROGRESS NOTES
"Gynecology Progress Note     S: Pt arrived to floor. Feeling better tonight as compared to earlier. Hungry and wants to eat. Denies dizziness but generally feeling weak. Mild uterine cramping, tolerable. No other pain.     O:  BP 99/58 (BP Location: Left arm, Patient Position: Semi-Gonzalez's, Cuff Size: Adult Regular)   Pulse 64   Temp 97.1  F (36.2  C) (Axillary)   Resp 21   Ht 1.65 m (5' 4.96\")   Wt 85.7 kg (189 lb)   SpO2 100%   BMI 31.49 kg/m      Gen: comfortable, no acute distress  CV: Regular rate  Lungs: Normal work of breathing  Abd: soft, non tender  : intrauterine gonzalez in place, bag not emptied from earlier in the day but minimal output since arrival  Ext: warm and well perfused    Labs:  Hemoglobin   Date Value Ref Range Status   01/24/2023 12.0 11.7 - 15.7 g/dL Final     Hemoglobin POCT   Date Value Ref Range Status   01/24/2023 11.9 11.7 - 15.7 g/dL Final       A/P: Mayda Ferro is a 27 year old female POD#0 s/p suction D&C for pregnancy termination, transferred from outpatient clinic for procedure complicated by post-op hemorrhage. Initially hypotensive but improved with fluid resuscitation. Stable at this time.    FEN: continue IVF until tolerating adequate PO intake, ok to advance to reg diet now that clinically stable  Pain: Doing well on PO meds  GI: Continue stool softeners and antiemetics  : intrauterine gonzalez in place, will keep until at least tomorrow morning. Continue ancef q8h for infection ppx while gonzalez in  Heme: EBL 1300 total. Hgb as above, repeat CBC ordered for tomorrow morning or sooner PRN. Discussed may need blood transfusion if Hgb <8 pending symptoms or <7 regardless    Dispo: Anticipate discharge pending stable bleeding, likely tomorrow vs POD#2    Nolvia Rainey MD  ObGyn, PGY-3  01/24/2023 8:50 PM      "

## 2023-01-25 NOTE — PROGRESS NOTES
Pt arrived from unit @ 1818 from ED. Report received from CALI Apple. VSS , afebrile upon arrival to the unit. LR running @ 125mL/hr. No current bleeding into gonzalez bag. UA collected. Report given to CALI Prabhakar.

## 2023-01-25 NOTE — PROGRESS NOTES
"[facility]  Gynecology Progress Note    S:  Feeling well overall.  Tolerating PO intake without nausea or emesis. Voiding spontaneously. Passing flatus.  Ambulating to bathroom without dizziness or difficulty. Denies pain currently. Denies fevers, chills, chest pain, shortness of breath, or other systemic complaints.    O:  Patient Vitals for the past 24 hrs:   BP Temp Temp src Pulse Resp SpO2 Height Weight   01/25/23 0642 -- -- -- -- -- 99 % -- --   01/25/23 0637 -- -- -- -- -- 99 % -- --   01/25/23 0517 -- -- -- -- -- 99 % -- --   01/25/23 0512 -- -- -- -- -- 99 % -- --   01/25/23 0507 -- -- -- -- -- 98 % -- --   01/25/23 0502 -- -- -- -- -- 98 % -- --   01/25/23 0327 -- -- -- -- -- 99 % -- --   01/25/23 0322 -- -- -- -- -- 100 % -- --   01/25/23 0317 -- -- -- -- -- 100 % -- --   01/25/23 0313 119/59 98.6  F (37  C) Oral -- 20 -- -- --   01/25/23 0312 -- -- -- -- -- 100 % -- --   01/25/23 0111 -- -- -- -- -- 100 % -- --   01/24/23 1900 -- -- -- -- -- 100 % -- --   01/24/23 1818 99/58 97.1  F (36.2  C) Axillary -- -- 100 % -- --   01/24/23 1745 -- -- -- 64 21 100 % -- --   01/24/23 1730 101/65 -- -- 64 15 100 % -- --   01/24/23 1715 96/49 -- -- 66 (!) 31 100 % -- --   01/24/23 1700 90/59 -- -- 60 14 97 % -- --   01/24/23 1645 (!) 89/52 -- -- 56 15 100 % -- --   01/24/23 1630 (!) 85/57 -- -- 71 20 100 % -- --   01/24/23 1615 -- -- -- 75 18 99 % -- --   01/24/23 1611 (!) 89/46 -- -- 63 19 100 % 1.65 m (5' 4.96\") 85.7 kg (189 lb)   01/24/23 1600 (!) 89/51 -- -- 60 27 100 % -- --   01/24/23 1555 92/80 -- -- 63 -- 99 % -- --     Gen: NAD  CV: well perfused, RR  Resp: Non-labored breathing on room air  Abd: Soft, non-distended, minimally tender.   : Uterine gonzalez balloon removed at bedside chaperoned by RN. No bleeding noted following removal.   Ext: Trace edema    Date 01/25/23 0700 - 01/26/23 0659   Shift 5694-0363 0492-2196 3407-2239 24 Hour Total   INTAKE   Shift Total(mL/kg)     "   OUTPUT   Urine 600   600   Drains 400   400   Shift Total(mL/kg) 1000(11.66)   1000(11.66)   Weight (kg) 85.73 85.73 85.73 85.73     Recent Results (from the past 24 hour(s))   INR    Collection Time: 01/24/23  4:00 PM   Result Value Ref Range    INR 1.18 (H) 0.85 - 1.15   Partial thromboplastin time    Collection Time: 01/24/23  4:00 PM   Result Value Ref Range    aPTT 23 22 - 38 Seconds   Comprehensive metabolic panel    Collection Time: 01/24/23  4:00 PM   Result Value Ref Range    Sodium 142 133 - 144 mmol/L    Potassium 3.6 3.4 - 5.3 mmol/L    Chloride 111 (H) 94 - 109 mmol/L    Carbon Dioxide (CO2) 23 20 - 32 mmol/L    Anion Gap 8 3 - 14 mmol/L    Urea Nitrogen 11 7 - 30 mg/dL    Creatinine 0.89 0.52 - 1.04 mg/dL    Calcium 8.3 (L) 8.5 - 10.1 mg/dL    Glucose 185 (H) 70 - 99 mg/dL    Alkaline Phosphatase 64 40 - 150 U/L    AST 10 0 - 45 U/L    ALT 14 0 - 50 U/L    Protein Total 5.7 (L) 6.8 - 8.8 g/dL    Albumin 3.2 (L) 3.4 - 5.0 g/dL    Bilirubin Total 0.4 0.2 - 1.3 mg/dL    GFR Estimate 90 >60 mL/min/1.73m2   HCG quantitative pregnancy    Collection Time: 01/24/23  4:00 PM   Result Value Ref Range    hCG Quantitative 104 (H) 0 - 5 IU/L   CBC with platelets and differential    Collection Time: 01/24/23  4:00 PM   Result Value Ref Range    WBC Count 12.8 (H) 4.0 - 11.0 10e3/uL    RBC Count 3.86 3.80 - 5.20 10e6/uL    Hemoglobin 12.0 11.7 - 15.7 g/dL    Hematocrit 36.6 35.0 - 47.0 %    MCV 95 78 - 100 fL    MCH 31.1 26.5 - 33.0 pg    MCHC 32.8 31.5 - 36.5 g/dL    RDW 12.0 10.0 - 15.0 %    Platelet Count 258 150 - 450 10e3/uL    % Neutrophils 43 %    % Lymphocytes 49 %    % Monocytes 4 %    % Eosinophils 2 %    % Basophils 1 %    % Immature Granulocytes 1 %    NRBCs per 100 WBC 0 <1 /100    Absolute Neutrophils 5.6 1.6 - 8.3 10e3/uL    Absolute Lymphocytes 6.4 (H) 0.8 - 5.3 10e3/uL    Absolute Monocytes 0.6 0.0 - 1.3 10e3/uL    Absolute Eosinophils 0.2 0.0 - 0.7 10e3/uL    Absolute Basophils 0.1 0.0 - 0.2  10e3/uL    Absolute Immature Granulocytes 0.1 <=0.4 10e3/uL    Absolute NRBCs 0.0 10e3/uL   Adult Type and Screen    Collection Time: 01/24/23  4:00 PM   Result Value Ref Range    ABO/RH(D) A POS     Antibody Screen Positive (A) Negative    SPECIMEN EXPIRATION DATE [id number]    Extra Blue Top Tube    Collection Time: 01/24/23  4:00 PM   Result Value Ref Range    Hold Specimen JIC    Extra Red Top Tube    Collection Time: 01/24/23  4:00 PM   Result Value Ref Range    Hold Specimen JIC    Extra Purple Top Tube    Collection Time: 01/24/23  4:00 PM   Result Value Ref Range    Hold Specimen JIC    RBC and Platelet Morphology    Collection Time: 01/24/23  4:00 PM   Result Value Ref Range    Platelet Assessment  Automated Count Confirmed. Platelet morphology is normal.     Automated Count Confirmed. Platelet morphology is normal.    RBC Morphology Confirmed RBC Indices    Red Cell Antigen Typing Non ABO:    Collection Time: 01/24/23  4:00 PM   Result Value Ref Range    M Antigen Type Negative     SPECIMEN EXPIRATION DATE 20230127235900    Antibody identification    Collection Time: 01/24/23  4:00 PM   Result Value Ref Range    Antibody Identification Anti-M     SPECIMEN EXPIRATION DATE 20230127235900    iStat INR, POCT    Collection Time: 01/24/23  4:01 PM   Result Value Ref Range    INR POCT 1.5 (L) 2.0 - 3.0   iStat Gases Electrolytes ICA Glucose Venous, POCT    Collection Time: 01/24/23  4:01 PM   Result Value Ref Range    CPB Applied No     Hematocrit POCT 35 35 - 47 %    Calcium, Ionized Whole Blood POCT 5.0 4.4 - 5.2 mg/dL    Glucose Whole Blood POCT 192 (H) 70 - 99 mg/dL    Bicarbonate Venous POCT 20 (L) 21 - 28 mmol/L    Hemoglobin POCT 11.9 11.7 - 15.7 g/dL    Potassium POCT 3.5 3.4 - 5.3 mmol/L    Sodium POCT 140 133 - 144 mmol/L    pCO2 Venous POCT 41 40 - 50 mm Hg    pO2 Venous POCT 53 (H) 25 - 47 mm Hg    pH Venous POCT 7.30 (L) 7.32 - 7.43    O2 Sat, Venous POCT 83 (L) 94 - 100 %   Prepare red blood  cells (unit)    Collection Time: 01/24/23  4:03 PM   Result Value Ref Range    ISSUE DATE AND TIME [id number]     Blood Component Type Red Blood Cells     Product Code R9297G48     Unit Status Issued     Unit Number [id number]     UNIT ABO/RH O-     CODING SYSTEM [id number]     UNIT TYPE ISBT 9500    Prepare red blood cells (unit)    Collection Time: 01/24/23  4:03 PM   Result Value Ref Range    ISSUE DATE AND TIME [id number]     Blood Component Type Red Blood Cells     Product Code S7842H43     Unit Status Issued     Unit Number [id number]     UNIT ABO/RH O-     CODING SYSTEM [id number]     UNIT TYPE ISBT 9500    Prepare red blood cells (unit)    Collection Time: 01/24/23  4:03 PM   Result Value Ref Range    ISSUE DATE AND TIME [id number]     Blood Component Type Red Blood Cells     Product Code E6579H46     Unit Status Returned     Unit Number [id number]     UNIT ABO/RH O-     CODING SYSTEM [id number]     UNIT TYPE ISBT 9500    Prepare red blood cells (unit)    Collection Time: 01/24/23  4:03 PM   Result Value Ref Range    ISSUE DATE AND TIME [id number]     Blood Component Type Red Blood Cells     Product Code Y7960A11     Unit Status Returned     Unit Number [id number]     UNIT ABO/RH O-     CODING SYSTEM [id number]     UNIT TYPE ISBT 9500    UA with Microscopic reflex to Culture    Collection Time: 01/24/23  7:04 PM    Specimen: Urine, Midstream   Result Value Ref Range    Color Urine Light Yellow Colorless, Straw, Light Yellow, Yellow    Appearance Urine Clear Clear    Glucose Urine Negative Negative mg/dL    Bilirubin Urine Negative Negative    Ketones Urine Negative Negative mg/dL    Specific Gravity Urine 1.019 1.003 - 1.035    Blood Urine Moderate (A) Negative    pH Urine 6.0 5.0 - 7.0    Protein Albumin Urine 10 (A) Negative mg/dL    Urobilinogen Urine Normal Normal, 2.0 mg/dL    Nitrite Urine Negative Negative    Leukocyte Esterase Urine Negative Negative    Mucus Urine Present (A) None  Seen /LPF    RBC Urine 26 (H) <=2 /HPF    WBC Urine 4 <=5 /HPF    Squamous Epithelials Urine 1 <=1 /HPF    Hyaline Casts Urine 4 (H) <=2 /LPF   Lactic acid whole blood    Collection Time: 23  8:29 PM   Result Value Ref Range    Lactic Acid 1.6 0.7 - 2.0 mmol/L   Extra Purple Top Tube    Collection Time: 23  8:42 PM   Result Value Ref Range    Hold Specimen [facility]    Basic metabolic panel    Collection Time: 23  6:08 AM   Result Value Ref Range    Sodium 143 133 - 144 mmol/L    Potassium 4.0 3.4 - 5.3 mmol/L    Chloride 113 (H) 94 - 109 mmol/L    Carbon Dioxide (CO2) 24 20 - 32 mmol/L    Anion Gap 6 3 - 14 mmol/L    Urea Nitrogen 7 7 - 30 mg/dL    Creatinine 0.68 0.52 - 1.04 mg/dL    Calcium 8.0 (L) 8.5 - 10.1 mg/dL    Glucose 103 (H) 70 - 99 mg/dL    GFR Estimate >90 >60 mL/min/1.73m2   CBC with platelets    Collection Time: 23  6:08 AM   Result Value Ref Range    WBC Count 7.7 4.0 - 11.0 10e3/uL    RBC Count 2.53 (L) 3.80 - 5.20 10e6/uL    Hemoglobin 7.9 (L) 11.7 - 15.7 g/dL    Hematocrit 23.9 (L) 35.0 - 47.0 %    MCV 95 78 - 100 fL    MCH 31.2 26.5 - 33.0 pg    MCHC 33.1 31.5 - 36.5 g/dL    RDW 12.3 10.0 - 15.0 %    Platelet Count 129 (L) 150 - 450 10e3/uL       Assessment:  Mayda Ferro is a 27 year old  POD#1 s/p suction D&C at [facility] for missed  s/p unsuccessful medical management. Procedure was complicated by post-operative hemorrhage requiring intrauterine balloon placement. Recovering appropriately without signs or symptoms of anemia today despite larger than anticipated drop in hemoglobin.     Plan:    # Hemorrhage s/p dilation and curettage of uterus   # Acute blood loss anemia and thrombocytopenia   - Intrauterine balloon removed following minimal output overnight. Discontinue Ancef ppx. Will continue to monitor pad weights/bleeding.   - Exam without concern for intraabdominal bleeding and vitals stable. However, hemoglobin dropped more than anticipated  this morning. Will repeat at 1200 and obtain CT A/P if hemoglobin is continuing to fall.   - Regular diet. Discontinue IVF.   - PRN ibuprofen and Tylenol for pain    Dispo: Anticipate discharge to home today pending stable repeat labs    Crispin Patton MD  Ob/Gyn Resident PGY-4  01/25/2023 7:20 AM    Staff MD Note    I appreciate the note by Dr. Patton.  Any necessary changes have been made by me.  I saw and evaluated the patient and agree with the findings and plan of care as documented in the note.  Patient feeling well, denies any abdominal pain or cramping, minimal bleeding since intrauterine balloon removed.  Tolerating diet.  Urinating without issues.  Objectively VSS.  Abdominal exam soft, nontender to palpation, nondistended.  Patient denies feeling dizzy with ambulation.  Plan continue to monitor and repeat labs at noon.  If continued decline in Hgb proceed with further imaging with CT A/P.     Leora Neely MD

## 2023-01-25 NOTE — UTILIZATION REVIEW
"Admission Status; Secondary Review Determination     Admission Date: 1/24/2023  3:49 PM       Under the authority of the Utilization Management Committee, the utilization review process indicated a secondary review on the above patient.  The review outcome is based on review of the medical records, discussions with staff, and applying clinical experience noted on the date of the review.          (x) Observation Status Appropriate - This patient does not meet hospital inpatient criteria and is placed in observation status. If this patient's primary payer is Medicare and was admitted as an inpatient, Condition Code 44 should be used and patient status changed to \"observation\".       RATIONALE FOR DETERMINATION      Brief clinical presentation, information copied from the chart, abbreviated and edited for relevant content:       Paged the team to change to OBS. Discharging after one night.     Mayda Ferro is a 27 year old woman who presented for vaginal bleeding [facility]. During the procedure there was concern for a false passage.  The procedure was able to be completed.  After the procedure it was noted larger amounts of bleeding than normal and a 35 ml gonzalez ballon was placed in the uterus. EMS was called and the patient was transported to [facility] ED. She received 100 units of fentanyl and 2g of versed prior to the procedure. POC confirmed after procedure. Doing well now and discharging.           Hospital course:  . Recovered as anticipated and experienced no post-operative complications. Prior to discharge the patient was voiding spontaneously, tolerating a regular diet, passing flatus, and had adequate pain control on an oral regimen. The patient was discharged on POD#2.         Discharge medications:     The severity of illness, intensity of cares provided, risk for adverse outcome, and expected LOS make the care appropriate for observation.       The information on this " document is developed by the utilization review team in order for the business office to ensure compliance.  This only denotes the appropriateness of proper admission status and does not reflect the quality of care rendered.         The definitions of Inpatient Status and Observation Status used in making the determination above are those provided in the CMS Coverage Manual, Chapter 1 and Chapter 6, section 70.4.      Sincerely,     Katarzyna Mason MD   Utilization Review/ Case Management  [facility].

## 2023-01-26 VITALS
HEIGHT: 65 IN | WEIGHT: 189 LBS | SYSTOLIC BLOOD PRESSURE: 121 MMHG | HEART RATE: 64 BPM | TEMPERATURE: 98.5 F | RESPIRATION RATE: 15 BRPM | DIASTOLIC BLOOD PRESSURE: 58 MMHG | OXYGEN SATURATION: 99 % | BODY MASS INDEX: 31.49 KG/M2

## 2023-01-29 LAB
BACTERIA BLD CULT: NO GROWTH
BACTERIA BLD CULT: NO GROWTH